# Patient Record
Sex: FEMALE | Race: BLACK OR AFRICAN AMERICAN | NOT HISPANIC OR LATINO | Employment: OTHER | URBAN - METROPOLITAN AREA
[De-identification: names, ages, dates, MRNs, and addresses within clinical notes are randomized per-mention and may not be internally consistent; named-entity substitution may affect disease eponyms.]

---

## 2020-10-10 ENCOUNTER — HOSPITAL ENCOUNTER (EMERGENCY)
Facility: OTHER | Age: 70
Discharge: HOME | End: 2020-10-10
Attending: EMERGENCY MEDICINE
Payer: MEDICARE

## 2020-10-10 VITALS
BODY MASS INDEX: 27.01 KG/M2 | OXYGEN SATURATION: 100 % | DIASTOLIC BLOOD PRESSURE: 80 MMHG | TEMPERATURE: 99 F | WEIGHT: 134 LBS | SYSTOLIC BLOOD PRESSURE: 159 MMHG | HEIGHT: 59 IN | HEART RATE: 95 BPM | RESPIRATION RATE: 19 BRPM

## 2020-10-10 DIAGNOSIS — S93.601A SPRAIN OF RIGHT FOOT, INITIAL ENCOUNTER: Primary | ICD-10-CM

## 2020-10-10 DIAGNOSIS — R52 PAIN: ICD-10-CM

## 2020-10-10 PROCEDURE — 99283 EMERGENCY DEPT VISIT LOW MDM: CPT | Mod: 25

## 2020-10-10 RX ORDER — AMLODIPINE BESYLATE 10 MG/1
10 TABLET ORAL DAILY
COMMUNITY

## 2020-10-10 RX ORDER — MULTIVITAMIN
1 TABLET ORAL DAILY
COMMUNITY

## 2020-10-10 RX ORDER — PRAVASTATIN SODIUM 20 MG/1
20 TABLET ORAL DAILY
COMMUNITY

## 2020-10-10 NOTE — ED TRIAGE NOTES
Pt AAOx4, presented to the ED with R foot pain. Pt stated she doesn't know what she did but it hurts. Pt stated she is a very active person. Pt denies taking any pain medication. Pain 5/10, feels like intermitted pinching. Pt denies SOB, chest pain, N/V/D and/or fever/chills.

## 2020-10-10 NOTE — ED PROVIDER NOTES
Encounter Date: 10/10/2020    SCRIBE #1 NOTE: I, Sharon Binu, am scribing for, and in the presence of, Dr. Soriano.       History     Chief Complaint   Patient presents with    Foot Pain     Pt reports right foot pain for the past three weeks. She is unsure of an injury but thinks she may have hit it on something     Time seen by provider: 2:08 PM    This is a 70 y.o. female who presents with complaint of right foot pain for the past 3 weeks. Pain is located on the lateral half of the dorsal aspect of her midfoot. She believes something may have fallen on her foot. She has not been taking anything for pain, though reports gradual improvement of pain since onset. Pain is worse with weight bearing and she is a frequent walker.  She denies any pain at rest.  Denies any redness.  She denies additional complaints at this time.    The history is provided by the patient.     Review of patient's allergies indicates:  No Known Allergies  Past Medical History:   Diagnosis Date    GERD (gastroesophageal reflux disease)     Hypertension      Past Surgical History:   Procedure Laterality Date    CHOLECYSTECTOMY      HYSTERECTOMY       History reviewed. No pertinent family history.  Social History     Tobacco Use    Smoking status: Never Smoker    Smokeless tobacco: Never Used   Substance Use Topics    Alcohol use: No    Drug use: No     Review of Systems   Constitutional: Negative for chills and fever.   Musculoskeletal: Positive for joint swelling. Negative for back pain and neck pain.        Positive for right foot pain.   Skin: Negative for color change and rash.   Neurological: Negative for weakness and numbness.       Physical Exam     Initial Vitals [10/10/20 1221]   BP Pulse Resp Temp SpO2   (!) 163/77 106 18 98.8 °F (37.1 °C) 99 %      MAP       --         Physical Exam    Nursing note and vitals reviewed.  Constitutional: She appears well-developed and well-nourished.   HENT:   Head: Normocephalic and atraumatic.    Eyes: Conjunctivae and EOM are normal.   Neck: Normal range of motion.   Cardiovascular:   2+ DP/PT pulses bilaterally.   Pulmonary/Chest: No respiratory distress.   Musculoskeletal: Normal range of motion.      Comments: RLE: Mild tenderness along lateral aspect of dorsal mid-foot with some associated swelling.  No overlying erythema.   Neurological: She is alert and oriented to person, place, and time.   Skin: Skin is warm and dry.   Psychiatric: Judgment and thought content normal.         ED Course   Procedures  Labs Reviewed - No data to display       Imaging Results          X-Ray Foot Complete Right (Final result)  Result time 10/10/20 12:49:45    Final result by Susan Lowe MD (10/10/20 12:49:45)                 Impression:      No etiology for the patient's pain identified on this exam.      Electronically signed by: Susan Lowe MD  Date:    10/10/2020  Time:    12:49             Narrative:    EXAMINATION:  XR FOOT COMPLETE 3 VIEW RIGHT    CLINICAL HISTORY:  . Pain, unspecified    TECHNIQUE:  AP, lateral, and oblique views of the right foot were performed.    COMPARISON:  None    FINDINGS:  There is calcified peripheral vascular atherosclerosis.  No fracture or dislocation.  The joint spaces are preserved.  In the area of interest, base of the 5th metatarsal, the findings are most compatible with a nutrient foramen rather than a fracture.  Specifically, no edema is identified in this region to support secondary signs for fracture.                              X-Rays:   Independently Interpreted Readings:   Other Readings:  X-Ray Foot Complete Right: No fracture or dislocation.    Medical Decision Making:   History:   Old Medical Records: I decided to obtain old medical records.  Old Records Summarized: other records.  Initial Assessment:   2:08PM:  Patient is a 70-year-old female who presents to the emergency department with right foot pain.  Patient appears well, nontoxic.  She does believe  that she did have some trauma to her foot.  She does have some tenderness and swelling to the lateral dorsal aspect of her midfoot.  Her x-ray from triage was negative for any acute findings.  Patient does likely have a sprain.  I do not appreciate any erythema, warmth, or any other evidence of infection.  Will plan for a postop shoe along with taking OTC analgesia as needed.  Do not feel that further workup in emergency room is indicated at this time.  I updated the patient regarding the x-ray results along with counseling the patient regarding supportive care measures.  I have discussed with the pt ED return warnings and need for close PCP f/u.  Pt agreeable to plan and all questions answered.  I feel that pt is stable for discharge and management as an outpatient and no further intervention is needed at this time.  Pt is comfortable returning to the ED if needed.  Will DC home in stable condition.      Independently Interpreted Test(s):   I have ordered and independently interpreted X-rays - see prior notes.  Clinical Tests:   Radiological Study: Ordered and Reviewed            Scribe Attestation:   Scribe #1: I performed the above scribed service and the documentation accurately describes the services I performed. I attest to the accuracy of the note.    Attending Attestation:           Physician Attestation for Scribe:  Physician Attestation Statement for Scribe #1: I, Dr. Soriano, reviewed documentation, as scribed by Sharon Schmid in my presence, and it is both accurate and complete.                           Clinical Impression:       ICD-10-CM ICD-9-CM   1. Sprain of right foot, initial encounter  S93.601A 845.10   2. Pain  R52 780.96                          ED Disposition Condition    Discharge Stable        ED Prescriptions     None        Follow-up Information     Follow up With Specialties Details Why Contact Info    [facility]   [address]  [id number]  [phone]      [facility]   [address] [id number] [phone]                                       Maria Elena Soriano MD  10/10/20 5141

## 2020-10-10 NOTE — DISCHARGE INSTRUCTIONS
Take tylenol as needed for pain.    Follow up with podiatry.    Please return to the ER if you have chest pain, difficulty breathing, fevers, altered mental status, dizziness, weakness, or any other concerns.

## 2020-10-20 ENCOUNTER — OFFICE VISIT (OUTPATIENT)
Dept: PODIATRY | Facility: CLINIC | Age: 70
End: 2020-10-20
Payer: MEDICARE

## 2020-10-20 VITALS
HEART RATE: 93 BPM | SYSTOLIC BLOOD PRESSURE: 150 MMHG | HEIGHT: 59 IN | BODY MASS INDEX: 26 KG/M2 | WEIGHT: 129 LBS | DIASTOLIC BLOOD PRESSURE: 70 MMHG

## 2020-10-20 DIAGNOSIS — M84.374A STRESS FRACTURE OF METATARSAL BONE OF RIGHT FOOT, INITIAL ENCOUNTER: ICD-10-CM

## 2020-10-20 DIAGNOSIS — M84.374A STRESS FRACTURE, RIGHT FOOT, INITIAL ENCOUNTER FOR FRACTURE: Primary | ICD-10-CM

## 2020-10-20 DIAGNOSIS — M79.671 RIGHT FOOT PAIN: ICD-10-CM

## 2020-10-20 PROCEDURE — 1159F PR MEDICATION LIST DOCUMENTED IN MEDICAL RECORD: ICD-10-PCS | Mod: S$GLB,,, | Performed by: PODIATRIST

## 2020-10-20 PROCEDURE — 1159F MED LIST DOCD IN RCRD: CPT | Mod: S$GLB,,, | Performed by: PODIATRIST

## 2020-10-20 PROCEDURE — 28470 CLTX METATARSAL FX WO MNP EA: CPT | Mod: RT,S$GLB,, | Performed by: PODIATRIST

## 2020-10-20 PROCEDURE — 99203 PR OFFICE/OUTPT VISIT, NEW, LEVL III, 30-44 MIN: ICD-10-PCS | Mod: 57,S$GLB,, | Performed by: PODIATRIST

## 2020-10-20 PROCEDURE — 3078F DIAST BP <80 MM HG: CPT | Mod: CPTII,S$GLB,, | Performed by: PODIATRIST

## 2020-10-20 PROCEDURE — 3008F PR BODY MASS INDEX (BMI) DOCUMENTED: ICD-10-PCS | Mod: CPTII,S$GLB,, | Performed by: PODIATRIST

## 2020-10-20 PROCEDURE — 3008F BODY MASS INDEX DOCD: CPT | Mod: CPTII,S$GLB,, | Performed by: PODIATRIST

## 2020-10-20 PROCEDURE — 99999 PR PBB SHADOW E&M-EST. PATIENT-LVL IV: ICD-10-PCS | Mod: PBBFAC,,, | Performed by: PODIATRIST

## 2020-10-20 PROCEDURE — 99203 OFFICE O/P NEW LOW 30 MIN: CPT | Mod: 57,S$GLB,, | Performed by: PODIATRIST

## 2020-10-20 PROCEDURE — 1125F AMNT PAIN NOTED PAIN PRSNT: CPT | Mod: S$GLB,,, | Performed by: PODIATRIST

## 2020-10-20 PROCEDURE — 3077F PR MOST RECENT SYSTOLIC BLOOD PRESSURE >= 140 MM HG: ICD-10-PCS | Mod: CPTII,S$GLB,, | Performed by: PODIATRIST

## 2020-10-20 PROCEDURE — 1101F PT FALLS ASSESS-DOCD LE1/YR: CPT | Mod: CPTII,S$GLB,, | Performed by: PODIATRIST

## 2020-10-20 PROCEDURE — 99999 PR PBB SHADOW E&M-EST. PATIENT-LVL IV: CPT | Mod: PBBFAC,,, | Performed by: PODIATRIST

## 2020-10-20 PROCEDURE — 3078F PR MOST RECENT DIASTOLIC BLOOD PRESSURE < 80 MM HG: ICD-10-PCS | Mod: CPTII,S$GLB,, | Performed by: PODIATRIST

## 2020-10-20 PROCEDURE — 28470 PR CLOSED RX METATARSAL FX: ICD-10-PCS | Mod: RT,S$GLB,, | Performed by: PODIATRIST

## 2020-10-20 PROCEDURE — 1125F PR PAIN SEVERITY QUANTIFIED, PAIN PRESENT: ICD-10-PCS | Mod: S$GLB,,, | Performed by: PODIATRIST

## 2020-10-20 PROCEDURE — 3077F SYST BP >= 140 MM HG: CPT | Mod: CPTII,S$GLB,, | Performed by: PODIATRIST

## 2020-10-20 PROCEDURE — 1101F PR PT FALLS ASSESS DOC 0-1 FALLS W/OUT INJ PAST YR: ICD-10-PCS | Mod: CPTII,S$GLB,, | Performed by: PODIATRIST

## 2020-10-20 RX ORDER — ASPIRIN 81 MG/1
81 TABLET ORAL
COMMUNITY
Start: 2020-04-27

## 2020-10-20 RX ORDER — HYDROCHLOROTHIAZIDE 25 MG/1
25 TABLET ORAL
COMMUNITY
Start: 2020-04-27

## 2020-10-20 RX ORDER — ALENDRONATE SODIUM 70 MG/1
TABLET ORAL
COMMUNITY
Start: 2020-07-25

## 2020-10-20 RX ORDER — PRAVASTATIN SODIUM 20 MG/1
20 TABLET ORAL
COMMUNITY
Start: 2020-05-13

## 2020-10-20 RX ORDER — AMLODIPINE BESYLATE 10 MG/1
10 TABLET ORAL
COMMUNITY
Start: 2020-01-16

## 2020-10-20 NOTE — PROGRESS NOTES
Subjective:      Patient ID: Almedia J Phoenix is a 70 y.o. female.    Chief Complaint: Foot Pain (Right Foot)    Patient presents to clinic with chief complaint of pain in the right foot, she points to area at the right 5th metatarsal base/shaft as source of pain. Symptoms started 1.5 months ago. She denies any trauma or foot surgery.  Pain is described as deep, sharp, throbbing pain aggravated with weightbearing, pressure, certain shoegear. She presented to the ED 2 weeks ago for right foot pain and xrays were obtained and she was told she did not have a fracture and was sent home with a post op shoe. She has been icing, elevating, weightbearing in post op shoe and taking OTC tylenol with no relief. Rates pain as 6/10.      Review of Systems   Constitution: Negative for chills, diaphoresis, fever and malaise/fatigue.   HENT: Negative for hearing loss.    Cardiovascular: Negative for claudication, leg swelling and syncope.   Respiratory: Negative for cough, shortness of breath, sputum production and wheezing.    Skin: Positive for dry skin. Negative for color change, flushing, nail changes, poor wound healing, rash, suspicious lesions and unusual hair distribution.   Musculoskeletal: Positive for myalgias. Negative for falls, gout, joint pain and muscle weakness.        R foot pain   Gastrointestinal: Negative for abdominal pain, nausea and vomiting.   Neurological: Negative for focal weakness, loss of balance, numbness, paresthesias, sensory change and weakness.   Psychiatric/Behavioral: Negative for altered mental status.           Objective:      Physical Exam  Constitutional:       General: She is not in acute distress.     Appearance: Normal appearance.   Cardiovascular:      Pulses:           Dorsalis pedis pulses are 2+ on the right side and 2+ on the left side.        Posterior tibial pulses are 2+ on the right side and 2+ on the left side.      Comments: 3 sec capillary refill time x 10 digits. Toes and  feet are warm to touch b/l. There is normal hair distribution on the feet and toes b/l. There is no edema b/l. No spider veins or varicosities present b/l.     Musculoskeletal:         General: Tenderness present. No swelling, deformity or signs of injury.      Comments: Mild edema and tenderness to palpation to dorsal and lateral aspect of right 5th metatarsal shaft and base.    Mild pain with plantarflexion, eversion, right lower extremity. Muscle strength is 5/5 in all groups bilaterally.  MTP, subtalar, and ankle joints range of motion are within normal limits without pain or crepitus bilaterally.    No deformity with rectus foot and ankle alignment bilat.    Ankle dorsiflexion decreased at <10 degrees bilateral with moderate increase with knee flexion bilateral.    Normal angle, base, station of gait. All ten toes without clubbing, cyanosis, or signs of ischemia.     Range of motion, stability, muscle strength, and muscle tone normal bilateral feet and legs.       Lymphadenopathy:      Comments: Negative lymphadenopathy bilateral popliteal fossa and tarsal tunnel.    Negative lymphangitic streaking bilateral feet/ankles/legs.      Skin:     General: Skin is warm and dry.      Capillary Refill: Capillary refill takes 2 to 3 seconds.      Findings: No bruising, erythema, lesion or rash.      Comments: Skin is normal age and health appropriate color, turgor, texture, and temperature bilateral lower extremities without ulceration, hyperpigmentation, discoloration, masses nodules or cords palpated.  No ecchymosis, erythema, or cardinal signs of infection bilateral lower extremities.     Neurological:      Mental Status: She is alert and oriented to person, place, and time.      Sensory: No sensory deficit.      Motor: No weakness.      Comments: Negative tinel sign to percussion sural, superficial peroneal, deep peroneal, saphenous, and posterior tibial nerves right and left ankles and feet.      Psychiatric:          Mood and Affect: Mood normal.         Thought Content: Thought content normal.                   Assessment:       Encounter Diagnoses   Name Primary?    Right foot pain     Stress fracture, right foot, initial encounter for fracture Yes         Plan:       Sheldon was seen today for foot pain.    Diagnoses and all orders for this visit:    Stress fracture, right foot, initial encounter for fracture    Right foot pain  -     X-Ray Foot Complete Right; Future      I counseled the patient on her conditions, their implications and medical management.    Xray of right foot obtained 10/10/20 was personally reviewed in office with patient. Right foot 5th metatarsal base with transverse, extraarticular area of lucency concerning for nondisplaced 5th metatarsal stress fracture.     Dispense fx boot right - ambulate to tolerance weaning to shoes when symptoms allow.       RICE right ankle in neutral position      The patient was advised that NSAID-type medications have two very important potential side effects: gastrointestinal irritation including hemorrhage and renal injuries. He was asked to take the medication with food and to stop if he experiences any GI upset. I asked him to call for vomiting, abdominal pain or black/bloody stools. The patient expresses understanding of these issues and questions were answered.    Rx lidocaine jelly topical to be applied daily to affected foot/ankle for symptom relief.     Discussed conservative treatment with shoes of adequate dimensions, material, and style to alleviate symptoms and delay or prevent surgical intervention.     Patient will stretch the tendo achilles complex three times daily as demonstrated in the office.  Literature was dispensed illustrating proper stretching technique.     I applied a plantar rest strapping to the patient's right foot to offload symptomatic area, support the arch, and relieve pain.     Ordered right foot xray to be obtained prior to next clinic  visit    Return to clinic in 1 month (around 11/20/2020) for follow up.     Patient seen and assisted by resident Suze Hook PGY2 under the direct supervision of attending Dr. Levy

## 2020-10-23 ENCOUNTER — TELEPHONE (OUTPATIENT)
Dept: PODIATRY | Facility: CLINIC | Age: 70
End: 2020-10-23

## 2020-10-23 RX ORDER — LIDOCAINE HYDROCHLORIDE 20 MG/ML
JELLY TOPICAL
Qty: 30 ML | Refills: 2 | Status: SHIPPED | OUTPATIENT
Start: 2020-10-23 | End: 2022-01-20

## 2020-10-23 NOTE — TELEPHONE ENCOUNTER
Spoke with patient Rx has been sent.                ----- Message from Kenny Levy DPM sent at 10/23/2020  1:35 PM CDT -----  Regarding: RE: medication  Entered.  Please let her know it has been sent.  Thank you.  ----- Message -----  From: July Palm MA  Sent: 10/23/2020  11:42 AM CDT  To: Kenny Levy DPM  Subject: FW: medication                                     ----- Message -----  From: Rebekah Meade  Sent: 10/23/2020   9:21 AM CDT  To: Cam Staton  Subject: medication                                       Name of Who is Calling: PHOENIX, ALMEDIA J [[id number]] What is the request in detail: Patient requesting a call back out the  lidocaine jelly topical that wasn't called in to the pharmacy to [facility] [address] [place] [address] Can the clinic reply by [facility]: No What Number to Call Back if not in [facility]: [phone]

## 2020-10-23 NOTE — TELEPHONE ENCOUNTER
Spoke with patient Dr Levy will send Rx to pharmacy            ----- Message from Rebekah Meade sent at 10/23/2020  9:21 AM CDT -----  Regarding: medication  Name of Who is Calling: PHOENIX, ALMEDIA J [[id number]] What is the request in detail: Patient requesting a call back out the  lidocaine jelly topical that wasn't called in to the pharmacy to [address] Can the clinic reply by [facility]: No What Number to Call Back if not in [facility]: [phone]

## 2020-11-24 ENCOUNTER — APPOINTMENT (OUTPATIENT)
Dept: RADIOLOGY | Facility: OTHER | Age: 70
End: 2020-11-24
Attending: STUDENT IN AN ORGANIZED HEALTH CARE EDUCATION/TRAINING PROGRAM
Payer: MEDICARE

## 2020-11-24 ENCOUNTER — OFFICE VISIT (OUTPATIENT)
Dept: PODIATRY | Facility: CLINIC | Age: 70
End: 2020-11-24
Payer: MEDICARE

## 2020-11-24 VITALS
HEIGHT: 59 IN | SYSTOLIC BLOOD PRESSURE: 144 MMHG | DIASTOLIC BLOOD PRESSURE: 68 MMHG | WEIGHT: 129 LBS | BODY MASS INDEX: 26 KG/M2 | HEART RATE: 92 BPM

## 2020-11-24 DIAGNOSIS — M79.671 RIGHT FOOT PAIN: ICD-10-CM

## 2020-11-24 DIAGNOSIS — M84.374D STRESS FRACTURE, RIGHT FOOT, SUBSEQUENT ENCOUNTER FOR FRACTURE WITH ROUTINE HEALING: ICD-10-CM

## 2020-11-24 DIAGNOSIS — M79.671 RIGHT FOOT PAIN: Primary | ICD-10-CM

## 2020-11-24 PROCEDURE — 73630 X-RAY EXAM OF FOOT: CPT | Mod: 26,RT,, | Performed by: RADIOLOGY

## 2020-11-24 PROCEDURE — 73630 XR FOOT COMPLETE 3 VIEW RIGHT: ICD-10-PCS | Mod: 26,RT,, | Performed by: RADIOLOGY

## 2020-11-24 PROCEDURE — 3288F PR FALLS RISK ASSESSMENT DOCUMENTED: ICD-10-PCS | Mod: CPTII,S$GLB,, | Performed by: PODIATRIST

## 2020-11-24 PROCEDURE — 1101F PT FALLS ASSESS-DOCD LE1/YR: CPT | Mod: CPTII,S$GLB,, | Performed by: PODIATRIST

## 2020-11-24 PROCEDURE — 3288F FALL RISK ASSESSMENT DOCD: CPT | Mod: CPTII,S$GLB,, | Performed by: PODIATRIST

## 2020-11-24 PROCEDURE — 3008F BODY MASS INDEX DOCD: CPT | Mod: CPTII,S$GLB,, | Performed by: PODIATRIST

## 2020-11-24 PROCEDURE — 99999 PR PBB SHADOW E&M-EST. PATIENT-LVL IV: ICD-10-PCS | Mod: PBBFAC,,, | Performed by: PODIATRIST

## 2020-11-24 PROCEDURE — 1125F AMNT PAIN NOTED PAIN PRSNT: CPT | Mod: S$GLB,,, | Performed by: PODIATRIST

## 2020-11-24 PROCEDURE — 1101F PR PT FALLS ASSESS DOC 0-1 FALLS W/OUT INJ PAST YR: ICD-10-PCS | Mod: CPTII,S$GLB,, | Performed by: PODIATRIST

## 2020-11-24 PROCEDURE — 99024 POSTOP FOLLOW-UP VISIT: CPT | Mod: S$GLB,,, | Performed by: PODIATRIST

## 2020-11-24 PROCEDURE — 73630 X-RAY EXAM OF FOOT: CPT | Mod: TC,RT

## 2020-11-24 PROCEDURE — 1125F PR PAIN SEVERITY QUANTIFIED, PAIN PRESENT: ICD-10-PCS | Mod: S$GLB,,, | Performed by: PODIATRIST

## 2020-11-24 PROCEDURE — 3008F PR BODY MASS INDEX (BMI) DOCUMENTED: ICD-10-PCS | Mod: CPTII,S$GLB,, | Performed by: PODIATRIST

## 2020-11-24 PROCEDURE — 99999 PR PBB SHADOW E&M-EST. PATIENT-LVL IV: CPT | Mod: PBBFAC,,, | Performed by: PODIATRIST

## 2020-11-24 PROCEDURE — 99024 PR POST-OP FOLLOW-UP VISIT: ICD-10-PCS | Mod: S$GLB,,, | Performed by: PODIATRIST

## 2020-11-24 NOTE — PROGRESS NOTES
Subjective:      Patient ID: Almedia J Phoenix is a 70 y.o. female.    Chief Complaint: Follow-up (Stress Fx)    Patient presents to clinic with chief complaint of pain in the right foot, she points to area at the right 5th metatarsal base/shaft as source of pain. Also reports to localized swelling in the area.  Symptoms started 2.5 months ago. She denies any trauma or foot surgery.  Pain is described as deep, sharp, throbbing pain aggravated with weightbearing, pressure, certain shoegear. Pain has improved significantly since last clinic visit, but still has residual pain with weightbearing and rates pain as 2-3/10. She has been weightbearing as tolerated in short CAM boot with some relief.     Review of Systems   Constitution: Negative for chills, diaphoresis, fever and malaise/fatigue.   HENT: Negative for hearing loss.    Cardiovascular: Negative for claudication, leg swelling and syncope.   Respiratory: Negative for cough, shortness of breath, sputum production and wheezing.    Skin: Positive for dry skin. Negative for color change, flushing, nail changes, poor wound healing, rash, suspicious lesions and unusual hair distribution.   Musculoskeletal: Positive for myalgias. Negative for falls, gout, joint pain and muscle weakness.        R foot pain   Gastrointestinal: Negative for abdominal pain, nausea and vomiting.   Neurological: Negative for focal weakness, loss of balance, numbness, paresthesias, sensory change and weakness.   Psychiatric/Behavioral: Negative for altered mental status.           Objective:      Physical Exam  Constitutional:       General: She is not in acute distress.     Appearance: Normal appearance.   Cardiovascular:      Pulses:           Dorsalis pedis pulses are 2+ on the right side and 2+ on the left side.        Posterior tibial pulses are 2+ on the right side and 2+ on the left side.      Comments: 3 sec capillary refill time x 10 digits. Toes and feet are warm to touch b/l. There  is normal hair distribution on the feet and toes b/l. There is no edema b/l. No spider veins or varicosities present b/l.     Musculoskeletal:         General: Tenderness present. No swelling, deformity or signs of injury.      Comments: Mild edema and tenderness to palpation to dorsal and lateral aspect of right 5th metatarsal shaft and base.    Mild pain with plantarflexion, eversion, right lower extremity. Muscle strength is 5/5 in all groups bilaterally.  MTP, subtalar, and ankle joints range of motion are within normal limits without pain or crepitus bilaterally.    No deformity with rectus foot and ankle alignment bilat.    Ankle dorsiflexion decreased at <10 degrees bilateral with moderate increase with knee flexion bilateral.    Normal angle, base, station of gait. All ten toes without clubbing, cyanosis, or signs of ischemia.     Range of motion, stability, muscle strength, and muscle tone normal bilateral feet and legs.       Lymphadenopathy:      Comments: Negative lymphadenopathy bilateral popliteal fossa and tarsal tunnel.    Negative lymphangitic streaking bilateral feet/ankles/legs.      Skin:     General: Skin is warm and dry.      Capillary Refill: Capillary refill takes 2 to 3 seconds.      Findings: No bruising, erythema, lesion or rash.      Comments: Skin is normal age and health appropriate color, turgor, texture, and temperature bilateral lower extremities without ulceration, hyperpigmentation, discoloration, masses nodules or cords palpated.  No ecchymosis, erythema, or cardinal signs of infection bilateral lower extremities.     Neurological:      Mental Status: She is alert and oriented to person, place, and time.      Sensory: No sensory deficit.      Motor: No weakness.      Comments: Negative tinel sign to percussion sural, superficial peroneal, deep peroneal, saphenous, and posterior tibial nerves right and left ankles and feet.      Psychiatric:         Mood and Affect: Mood normal.          Thought Content: Thought content normal.                   Assessment:       Encounter Diagnoses   Name Primary?    Right foot pain Yes    Stress fracture, right foot, subsequent encounter for fracture with routine healing          Plan:       Sheldon was seen today for follow-up.    Diagnoses and all orders for this visit:    Right foot pain    Stress fracture, right foot, subsequent encounter for fracture with routine healing      I counseled the patient on her conditions, their implications and medical management.    Xray of right foot obtained 10/10/20 was personally reviewed in office with patient. Right foot 5th metatarsal base with transverse, extraarticular area of lucency concerning for nondisplaced 5th metatarsal stress fracture.   Repeat xray obtained 11/24/2020 with good interval healing at the right 5th metatarsal base.     Continue fx boot right - ambulate to tolerance weaning to shoes when symptoms allow. Continue walker all times for stability.      RICE right ankle in neutral position      The patient was advised that NSAID-type medications have two very important potential side effects: gastrointestinal irritation including hemorrhage and renal injuries. He was asked to take the medication with food and to stop if he experiences any GI upset. I asked him to call for vomiting, abdominal pain or black/bloody stools. The patient expresses understanding of these issues and questions were answered.    Continue lidocaine jelly topical to be applied daily to affected foot/ankle for symptom relief.     Discussed conservative treatment with shoes of adequate dimensions, material, and style to alleviate symptoms and delay or prevent surgical intervention.    Repeat xray right foot in 1 month prior to next clinic visit    Return to clinic in 1 month (around 12/20/2020) for follow up.     Patient seen and assisted by resident Suze POOLE under the direct supervision of attending Dr. Levy

## 2020-12-24 ENCOUNTER — APPOINTMENT (OUTPATIENT)
Dept: RADIOLOGY | Facility: OTHER | Age: 70
End: 2020-12-24
Attending: PODIATRIST
Payer: MEDICARE

## 2020-12-24 DIAGNOSIS — M84.374D STRESS FRACTURE, RIGHT FOOT, SUBSEQUENT ENCOUNTER FOR FRACTURE WITH ROUTINE HEALING: ICD-10-CM

## 2020-12-24 DIAGNOSIS — M79.671 RIGHT FOOT PAIN: ICD-10-CM

## 2020-12-24 PROCEDURE — 73630 X-RAY EXAM OF FOOT: CPT | Mod: TC,RT

## 2020-12-24 PROCEDURE — 73630 X-RAY EXAM OF FOOT: CPT | Mod: 26,RT,, | Performed by: RADIOLOGY

## 2020-12-24 PROCEDURE — 73630 XR FOOT COMPLETE 3 VIEW RIGHT: ICD-10-PCS | Mod: 26,RT,, | Performed by: RADIOLOGY

## 2020-12-29 ENCOUNTER — OFFICE VISIT (OUTPATIENT)
Dept: PODIATRY | Facility: CLINIC | Age: 70
End: 2020-12-29
Payer: MEDICARE

## 2020-12-29 VITALS
WEIGHT: 129 LBS | BODY MASS INDEX: 26 KG/M2 | HEART RATE: 103 BPM | HEIGHT: 59 IN | DIASTOLIC BLOOD PRESSURE: 69 MMHG | SYSTOLIC BLOOD PRESSURE: 148 MMHG

## 2020-12-29 DIAGNOSIS — M84.374D STRESS FRACTURE, RIGHT FOOT, SUBSEQUENT ENCOUNTER FOR FRACTURE WITH ROUTINE HEALING: Primary | ICD-10-CM

## 2020-12-29 PROCEDURE — 1101F PR PT FALLS ASSESS DOC 0-1 FALLS W/OUT INJ PAST YR: ICD-10-PCS | Mod: CPTII,S$GLB,, | Performed by: PODIATRIST

## 2020-12-29 PROCEDURE — 1126F PR PAIN SEVERITY QUANTIFIED, NO PAIN PRESENT: ICD-10-PCS | Mod: S$GLB,,, | Performed by: PODIATRIST

## 2020-12-29 PROCEDURE — 3008F PR BODY MASS INDEX (BMI) DOCUMENTED: ICD-10-PCS | Mod: CPTII,S$GLB,, | Performed by: PODIATRIST

## 2020-12-29 PROCEDURE — 3008F BODY MASS INDEX DOCD: CPT | Mod: CPTII,S$GLB,, | Performed by: PODIATRIST

## 2020-12-29 PROCEDURE — 99999 PR PBB SHADOW E&M-EST. PATIENT-LVL IV: CPT | Mod: PBBFAC,,, | Performed by: PODIATRIST

## 2020-12-29 PROCEDURE — 99024 POSTOP FOLLOW-UP VISIT: CPT | Mod: S$GLB,,, | Performed by: PODIATRIST

## 2020-12-29 PROCEDURE — 1101F PT FALLS ASSESS-DOCD LE1/YR: CPT | Mod: CPTII,S$GLB,, | Performed by: PODIATRIST

## 2020-12-29 PROCEDURE — 3288F PR FALLS RISK ASSESSMENT DOCUMENTED: ICD-10-PCS | Mod: CPTII,S$GLB,, | Performed by: PODIATRIST

## 2020-12-29 PROCEDURE — 3288F FALL RISK ASSESSMENT DOCD: CPT | Mod: CPTII,S$GLB,, | Performed by: PODIATRIST

## 2020-12-29 PROCEDURE — 1126F AMNT PAIN NOTED NONE PRSNT: CPT | Mod: S$GLB,,, | Performed by: PODIATRIST

## 2020-12-29 PROCEDURE — 99999 PR PBB SHADOW E&M-EST. PATIENT-LVL IV: ICD-10-PCS | Mod: PBBFAC,,, | Performed by: PODIATRIST

## 2020-12-29 PROCEDURE — 99024 PR POST-OP FOLLOW-UP VISIT: ICD-10-PCS | Mod: S$GLB,,, | Performed by: PODIATRIST

## 2020-12-29 NOTE — PROGRESS NOTES
Subjective:      Patient ID: Almedia J Phoenix is a 70 y.o. female.    Chief Complaint: Foot Injury    Follow-up right 5th metatarsal stress fracture.  Patient ambulating to tolerance in fracture boot right.  Minimal symptoms with mild intermittent pain just enough to notice resolving immediately.  Generally she says is trending for the better significantly.  Does not bother her on a daily basis.    X-rays today show good healing bony bridging of the stress fracture proximal 5th metatarsal right in good position.    Review of Systems   Constitution: Negative for chills, diaphoresis, fever and malaise/fatigue.   HENT: Negative for hearing loss.    Cardiovascular: Negative for claudication, leg swelling and syncope.   Respiratory: Negative for cough, shortness of breath, sputum production and wheezing.    Skin: Positive for dry skin. Negative for color change, flushing, nail changes, poor wound healing, rash, suspicious lesions and unusual hair distribution.   Musculoskeletal: Positive for myalgias. Negative for falls, gout, joint pain and muscle weakness.        R foot pain   Gastrointestinal: Negative for abdominal pain, nausea and vomiting.   Neurological: Negative for focal weakness, loss of balance, numbness, paresthesias, sensory change and weakness.   Psychiatric/Behavioral: Negative for altered mental status.           Objective:      Physical Exam  Constitutional:       General: She is not in acute distress.     Appearance: Normal appearance.   Cardiovascular:      Pulses:           Dorsalis pedis pulses are 2+ on the right side and 2+ on the left side.        Posterior tibial pulses are 2+ on the right side and 2+ on the left side.      Comments: 3 sec capillary refill time x 10 digits. Toes and feet are warm to touch b/l. There is normal hair distribution on the feet and toes b/l. There is no edema b/l. No spider veins or varicosities present b/l.     Musculoskeletal:         General: Tenderness present. No  swelling, deformity or signs of injury.      Comments: Mild fullness without tenderness to palpation to dorsal and lateral aspect of right 5th metatarsal shaft and base.    No current pain with plantarflexion, eversion, right lower extremity. Muscle strength is 5/5 in all groups bilaterally.  MTP, subtalar, and ankle joints range of motion are within normal limits without pain or crepitus bilaterally.    No deformity with rectus foot and ankle alignment bilat.    Ankle dorsiflexion decreased at <10 degrees bilateral with moderate increase with knee flexion bilateral.        Range of motion, stability, muscle strength, and muscle tone normal bilateral feet and legs.       Lymphadenopathy:      Comments: Negative lymphadenopathy bilateral popliteal fossa and tarsal tunnel.    Negative lymphangitic streaking bilateral feet/ankles/legs.      Skin:     General: Skin is warm and dry.      Capillary Refill: Capillary refill takes 2 to 3 seconds.      Findings: No bruising, erythema, lesion or rash.      Comments: Skin is normal age and health appropriate color, turgor, texture, and temperature bilateral lower extremities without ulceration, hyperpigmentation, discoloration, masses nodules or cords palpated.  No ecchymosis, erythema, or cardinal signs of infection bilateral lower extremities.     Neurological:      Mental Status: She is alert and oriented to person, place, and time.      Sensory: No sensory deficit.      Motor: No weakness.      Comments: Negative tinel sign to percussion sural, superficial peroneal, deep peroneal, saphenous, and posterior tibial nerves right and left ankles and feet.      Psychiatric:         Mood and Affect: Mood normal.         Thought Content: Thought content normal.                   Assessment:       No diagnosis found.      Plan:       There are no diagnoses linked to this encounter.  I counseled the patient on her conditions, their implications and medical management.    Patient can  now wean from the fracture boot right hour at a time until she resumes full-time ambulation in casual shoe gear.    She will continue to use the cane for stability at all times when ambulating.    Follow up 1 month for re-evaluation x-rays prior sooner p.r.n..    If she experiences pain or swelling in the right foot she will revert to the fracture boot and present here immediately for evaluation.

## 2021-02-02 ENCOUNTER — APPOINTMENT (OUTPATIENT)
Dept: RADIOLOGY | Facility: OTHER | Age: 71
End: 2021-02-02
Attending: PODIATRIST
Payer: MEDICARE

## 2021-02-02 ENCOUNTER — OFFICE VISIT (OUTPATIENT)
Dept: PODIATRY | Facility: CLINIC | Age: 71
End: 2021-02-02
Payer: MEDICARE

## 2021-02-02 VITALS
DIASTOLIC BLOOD PRESSURE: 64 MMHG | WEIGHT: 128 LBS | BODY MASS INDEX: 25.8 KG/M2 | SYSTOLIC BLOOD PRESSURE: 142 MMHG | HEART RATE: 93 BPM | HEIGHT: 59 IN

## 2021-02-02 DIAGNOSIS — M84.374D STRESS FRACTURE, RIGHT FOOT, SUBSEQUENT ENCOUNTER FOR FRACTURE WITH ROUTINE HEALING: ICD-10-CM

## 2021-02-02 DIAGNOSIS — M84.374D STRESS FRACTURE, RIGHT FOOT, SUBSEQUENT ENCOUNTER FOR FRACTURE WITH ROUTINE HEALING: Primary | ICD-10-CM

## 2021-02-02 PROCEDURE — 1101F PT FALLS ASSESS-DOCD LE1/YR: CPT | Mod: CPTII,S$GLB,, | Performed by: PODIATRIST

## 2021-02-02 PROCEDURE — 99999 PR PBB SHADOW E&M-EST. PATIENT-LVL IV: CPT | Mod: PBBFAC,,, | Performed by: PODIATRIST

## 2021-02-02 PROCEDURE — 99024 POSTOP FOLLOW-UP VISIT: CPT | Mod: S$GLB,,, | Performed by: PODIATRIST

## 2021-02-02 PROCEDURE — 1126F PR PAIN SEVERITY QUANTIFIED, NO PAIN PRESENT: ICD-10-PCS | Mod: S$GLB,,, | Performed by: PODIATRIST

## 2021-02-02 PROCEDURE — 1101F PR PT FALLS ASSESS DOC 0-1 FALLS W/OUT INJ PAST YR: ICD-10-PCS | Mod: CPTII,S$GLB,, | Performed by: PODIATRIST

## 2021-02-02 PROCEDURE — 3288F FALL RISK ASSESSMENT DOCD: CPT | Mod: CPTII,S$GLB,, | Performed by: PODIATRIST

## 2021-02-02 PROCEDURE — 73630 XR FOOT COMPLETE 3 VIEW RIGHT: ICD-10-PCS | Mod: 26,RT,, | Performed by: RADIOLOGY

## 2021-02-02 PROCEDURE — 3288F PR FALLS RISK ASSESSMENT DOCUMENTED: ICD-10-PCS | Mod: CPTII,S$GLB,, | Performed by: PODIATRIST

## 2021-02-02 PROCEDURE — 3008F PR BODY MASS INDEX (BMI) DOCUMENTED: ICD-10-PCS | Mod: CPTII,S$GLB,, | Performed by: PODIATRIST

## 2021-02-02 PROCEDURE — 99024 PR POST-OP FOLLOW-UP VISIT: ICD-10-PCS | Mod: S$GLB,,, | Performed by: PODIATRIST

## 2021-02-02 PROCEDURE — 73630 X-RAY EXAM OF FOOT: CPT | Mod: 26,RT,, | Performed by: RADIOLOGY

## 2021-02-02 PROCEDURE — 99999 PR PBB SHADOW E&M-EST. PATIENT-LVL IV: ICD-10-PCS | Mod: PBBFAC,,, | Performed by: PODIATRIST

## 2021-02-02 PROCEDURE — 73630 X-RAY EXAM OF FOOT: CPT | Mod: TC,RT

## 2021-02-02 PROCEDURE — 1126F AMNT PAIN NOTED NONE PRSNT: CPT | Mod: S$GLB,,, | Performed by: PODIATRIST

## 2021-02-02 PROCEDURE — 3008F BODY MASS INDEX DOCD: CPT | Mod: CPTII,S$GLB,, | Performed by: PODIATRIST

## 2021-06-29 ENCOUNTER — HOSPITAL ENCOUNTER (EMERGENCY)
Facility: OTHER | Age: 71
Discharge: HOME | End: 2021-06-29
Attending: EMERGENCY MEDICINE
Payer: MEDICARE

## 2021-06-29 VITALS
BODY MASS INDEX: 25.2 KG/M2 | HEIGHT: 59 IN | SYSTOLIC BLOOD PRESSURE: 146 MMHG | RESPIRATION RATE: 18 BRPM | WEIGHT: 125 LBS | OXYGEN SATURATION: 98 % | DIASTOLIC BLOOD PRESSURE: 76 MMHG | HEART RATE: 100 BPM | TEMPERATURE: 98 F

## 2021-06-29 DIAGNOSIS — M25.569 KNEE PAIN: ICD-10-CM

## 2021-06-29 DIAGNOSIS — M25.561 ACUTE PAIN OF RIGHT KNEE: Primary | ICD-10-CM

## 2021-06-29 PROCEDURE — 25000003 PHARM REV CODE 250: Performed by: NURSE PRACTITIONER

## 2021-06-29 PROCEDURE — 99284 EMERGENCY DEPT VISIT MOD MDM: CPT | Mod: 25

## 2021-06-29 RX ORDER — IBUPROFEN 600 MG/1
600 TABLET ORAL EVERY 6 HOURS PRN
Qty: 20 TABLET | Refills: 0 | Status: SHIPPED | OUTPATIENT
Start: 2021-06-29

## 2021-06-29 RX ORDER — DICLOFENAC SODIUM 10 MG/G
2 GEL TOPICAL DAILY
Qty: 100 G | Refills: 0 | OUTPATIENT
Start: 2021-06-29 | End: 2022-07-04

## 2021-06-29 RX ORDER — IBUPROFEN 600 MG/1
600 TABLET ORAL
Status: COMPLETED | OUTPATIENT
Start: 2021-06-29 | End: 2021-06-29

## 2021-06-29 RX ADMIN — IBUPROFEN 600 MG: 600 TABLET, FILM COATED ORAL at 11:06

## 2022-01-20 ENCOUNTER — OFFICE VISIT (OUTPATIENT)
Dept: PODIATRY | Facility: CLINIC | Age: 72
End: 2022-01-20
Payer: MEDICARE

## 2022-01-20 ENCOUNTER — APPOINTMENT (OUTPATIENT)
Dept: RADIOLOGY | Facility: OTHER | Age: 72
End: 2022-01-20
Attending: PODIATRIST
Payer: MEDICARE

## 2022-01-20 VITALS
BODY MASS INDEX: 25.2 KG/M2 | HEART RATE: 106 BPM | SYSTOLIC BLOOD PRESSURE: 150 MMHG | HEIGHT: 59 IN | WEIGHT: 125 LBS | DIASTOLIC BLOOD PRESSURE: 71 MMHG

## 2022-01-20 DIAGNOSIS — M79.671 FOOT PAIN, RIGHT: ICD-10-CM

## 2022-01-20 DIAGNOSIS — S96.911A STRAIN OF FOOT, RIGHT, INITIAL ENCOUNTER: ICD-10-CM

## 2022-01-20 DIAGNOSIS — S90.31XA CONTUSION OF RIGHT FOOT, INITIAL ENCOUNTER: Primary | ICD-10-CM

## 2022-01-20 DIAGNOSIS — S90.31XA CONTUSION OF RIGHT FOOT, INITIAL ENCOUNTER: ICD-10-CM

## 2022-01-20 PROCEDURE — 3008F BODY MASS INDEX DOCD: CPT | Mod: CPTII,S$GLB,, | Performed by: PODIATRIST

## 2022-01-20 PROCEDURE — 73630 XR FOOT COMPLETE 3 VIEW RIGHT: ICD-10-PCS | Mod: 26,RT,, | Performed by: RADIOLOGY

## 2022-01-20 PROCEDURE — 99214 PR OFFICE/OUTPT VISIT, EST, LEVL IV, 30-39 MIN: ICD-10-PCS | Mod: 25,S$GLB,, | Performed by: PODIATRIST

## 2022-01-20 PROCEDURE — 1160F PR REVIEW ALL MEDS BY PRESCRIBER/CLIN PHARMACIST DOCUMENTED: ICD-10-PCS | Mod: CPTII,S$GLB,, | Performed by: PODIATRIST

## 2022-01-20 PROCEDURE — 1160F RVW MEDS BY RX/DR IN RCRD: CPT | Mod: CPTII,S$GLB,, | Performed by: PODIATRIST

## 2022-01-20 PROCEDURE — 3077F SYST BP >= 140 MM HG: CPT | Mod: CPTII,S$GLB,, | Performed by: PODIATRIST

## 2022-01-20 PROCEDURE — 3288F PR FALLS RISK ASSESSMENT DOCUMENTED: ICD-10-PCS | Mod: CPTII,S$GLB,, | Performed by: PODIATRIST

## 2022-01-20 PROCEDURE — 73630 X-RAY EXAM OF FOOT: CPT | Mod: 26,RT,, | Performed by: RADIOLOGY

## 2022-01-20 PROCEDURE — 99999 PR PBB SHADOW E&M-EST. PATIENT-LVL IV: CPT | Mod: PBBFAC,,, | Performed by: PODIATRIST

## 2022-01-20 PROCEDURE — 99999 PR PBB SHADOW E&M-EST. PATIENT-LVL IV: ICD-10-PCS | Mod: PBBFAC,,, | Performed by: PODIATRIST

## 2022-01-20 PROCEDURE — 3077F PR MOST RECENT SYSTOLIC BLOOD PRESSURE >= 140 MM HG: ICD-10-PCS | Mod: CPTII,S$GLB,, | Performed by: PODIATRIST

## 2022-01-20 PROCEDURE — 3078F PR MOST RECENT DIASTOLIC BLOOD PRESSURE < 80 MM HG: ICD-10-PCS | Mod: CPTII,S$GLB,, | Performed by: PODIATRIST

## 2022-01-20 PROCEDURE — 73630 X-RAY EXAM OF FOOT: CPT | Mod: TC,RT

## 2022-01-20 PROCEDURE — 3008F PR BODY MASS INDEX (BMI) DOCUMENTED: ICD-10-PCS | Mod: CPTII,S$GLB,, | Performed by: PODIATRIST

## 2022-01-20 PROCEDURE — 1159F PR MEDICATION LIST DOCUMENTED IN MEDICAL RECORD: ICD-10-PCS | Mod: CPTII,S$GLB,, | Performed by: PODIATRIST

## 2022-01-20 PROCEDURE — 1101F PT FALLS ASSESS-DOCD LE1/YR: CPT | Mod: CPTII,S$GLB,, | Performed by: PODIATRIST

## 2022-01-20 PROCEDURE — 1125F AMNT PAIN NOTED PAIN PRSNT: CPT | Mod: CPTII,S$GLB,, | Performed by: PODIATRIST

## 2022-01-20 PROCEDURE — 1125F PR PAIN SEVERITY QUANTIFIED, PAIN PRESENT: ICD-10-PCS | Mod: CPTII,S$GLB,, | Performed by: PODIATRIST

## 2022-01-20 PROCEDURE — 1159F MED LIST DOCD IN RCRD: CPT | Mod: CPTII,S$GLB,, | Performed by: PODIATRIST

## 2022-01-20 PROCEDURE — 29540 PR STRAPPING; ANKLE &/OR FOOT: ICD-10-PCS | Mod: RT,S$GLB,, | Performed by: PODIATRIST

## 2022-01-20 PROCEDURE — 3078F DIAST BP <80 MM HG: CPT | Mod: CPTII,S$GLB,, | Performed by: PODIATRIST

## 2022-01-20 PROCEDURE — 3288F FALL RISK ASSESSMENT DOCD: CPT | Mod: CPTII,S$GLB,, | Performed by: PODIATRIST

## 2022-01-20 PROCEDURE — 99214 OFFICE O/P EST MOD 30 MIN: CPT | Mod: 25,S$GLB,, | Performed by: PODIATRIST

## 2022-01-20 PROCEDURE — 29540 STRAPPING ANKLE &/FOOT: CPT | Mod: RT,S$GLB,, | Performed by: PODIATRIST

## 2022-01-20 PROCEDURE — 1101F PR PT FALLS ASSESS DOC 0-1 FALLS W/OUT INJ PAST YR: ICD-10-PCS | Mod: CPTII,S$GLB,, | Performed by: PODIATRIST

## 2022-01-20 RX ORDER — LIDOCAINE HYDROCHLORIDE 20 MG/ML
JELLY TOPICAL
Qty: 30 ML | Refills: 2 | Status: SHIPPED | OUTPATIENT
Start: 2022-01-20

## 2022-01-20 NOTE — PROGRESS NOTES
Subjective:      Patient ID: Almedia J Phoenix is a 71 y.o. female.    Chief Complaint: Follow-up    Sharp deep pain top of the big toe joint and medial arch right foot indicated with index finger.  Rapid onset yesterday dropping her cane and having it hit her foot.  Aggravated with increased weight-bearing and some shoes.  No change since onset.  No prior medical treatment.  No self-treatment.  She wears a slipper today  Review of Systems   Constitutional: Negative for chills, diaphoresis, fever, malaise/fatigue and night sweats.   Cardiovascular: Negative for claudication, cyanosis, leg swelling and syncope.   Skin: Negative for color change, dry skin, nail changes, rash, suspicious lesions and unusual hair distribution.   Musculoskeletal: Positive for joint pain. Negative for falls, joint swelling, muscle cramps, muscle weakness and stiffness.   Gastrointestinal: Negative for constipation, diarrhea, nausea and vomiting.   Neurological: Negative for brief paralysis, disturbances in coordination, focal weakness, numbness, paresthesias, sensory change and tremors.           Objective:      Physical Exam  Constitutional:       Appearance: She is well-developed and well-nourished. She is not diaphoretic.      Comments: Oriented to time, place, and person.   Cardiovascular:      Pulses:           Dorsalis pedis pulses are 1+ on the right side and 1+ on the left side.        Posterior tibial pulses are 1+ on the right side and 1+ on the left side.      Comments: Capillary fill time 3-5 seconds.  All toes warm to touch.      Negative lower extremity edema bilateral.    Negative elevational pallor and dependent rubor bilateral.    Musculoskeletal:      Comments: Normal angle, base, station of gait. Decreased stride length, early heel off, moderately propulsive toe off bilateral.    All ten toes without clubbing, cyanosis, or signs of ischemia.      Pain to palpation of the dorsomedial aspect of 1st metatarsophalangeal  joint right and along the medial arch right without deformity, loss of function, signs of acute trauma right foot.    Range of motion, stability, muscle strength, and muscle tone are age and health appropriate normal bilateral feet and legs.       Lymphadenopathy:      Comments: Negative lymphadenopathy bilateral popliteal fossa and tarsal tunnel.  Negative lymphangitic streaking bilateral foot/ankle bilateral.     Skin:     General: Skin is warm, dry and intact.      Coloration: Skin is not pale.      Findings: No abrasion, bruising, burn, ecchymosis, erythema, laceration, lesion, petechiae or rash.      Nails: There is no clubbing or cyanosis.      Comments: Skin thin, atrophic, with decreased density and distribution of pedal hair bilateral, but without hyperpigmentation, dorothy discoloration,  ulcers, masses, nodules or cords palpated bilateral feet and legs.      Toenails 1st, 2nd, 3rd, 4th, 5th  bilateral are hypertrophic thickened 2-3 mm, dystrophic, discolored tanish brown with tan, gray crumbly subungual debris.  Tender to distal nail plate pressure, without periungual skin abnormality of each.     Neurological:      Mental Status: She is alert and oriented to person, place, and time. She is not disoriented.      Motor: No tremor, atrophy or abnormal muscle tone.      Deep Tendon Reflexes: Strength normal.      Comments: Negative tinel sign to percussion sural, superficial peroneal, deep peroneal, saphenous, and posterior tibial nerves right and left ankles and feet.    Negative allodynia both feet   Psychiatric:         Behavior: Behavior is cooperative.               Assessment:       Encounter Diagnoses   Name Primary?    Contusion of right foot, initial encounter Yes    Foot pain, right     Strain of foot, right, initial encounter          Plan:       Sheldon was seen today for follow-up.    Diagnoses and all orders for this visit:    Contusion of right foot, initial encounter  -     X-Ray Foot  Complete Right; Future    Foot pain, right  -     X-Ray Foot Complete Right; Future    Strain of foot, right, initial encounter  -     X-Ray Foot Complete Right; Future    Other orders  -     LIDOcaine HCL 2% (XYLOCAINE) 2 % jelly; Apply topically as needed (Pain). Apply topically once daily approximately 30 minutes before bedtime to affected area of foot/feet.      I counseled the patient on her conditions, their implications and medical management.        I applied a plantar rest strapping to the patient's right foot to offload symptomatic area, support the arch, and relieve pain.  \  Continue minimal ambulation with cane for gait assist for stability in slipper right to accommodate as needed.    Patient should return to normal lace-up shoes to her tolerance as soon as symptoms permit.    X-rays right foot.    Lidocaine gel          No follow-ups on file.

## 2022-02-03 ENCOUNTER — OFFICE VISIT (OUTPATIENT)
Dept: PODIATRY | Facility: CLINIC | Age: 72
End: 2022-02-03
Payer: MEDICARE

## 2022-02-03 VITALS
DIASTOLIC BLOOD PRESSURE: 64 MMHG | HEART RATE: 88 BPM | BODY MASS INDEX: 25.2 KG/M2 | HEIGHT: 59 IN | SYSTOLIC BLOOD PRESSURE: 136 MMHG | WEIGHT: 125 LBS

## 2022-02-03 DIAGNOSIS — S96.911A STRAIN OF FOOT, RIGHT, INITIAL ENCOUNTER: ICD-10-CM

## 2022-02-03 DIAGNOSIS — M79.671 FOOT PAIN, RIGHT: ICD-10-CM

## 2022-02-03 DIAGNOSIS — S90.31XA CONTUSION OF RIGHT FOOT, INITIAL ENCOUNTER: Primary | ICD-10-CM

## 2022-02-03 PROCEDURE — 3075F PR MOST RECENT SYSTOLIC BLOOD PRESS GE 130-139MM HG: ICD-10-PCS | Mod: CPTII,S$GLB,, | Performed by: PODIATRIST

## 2022-02-03 PROCEDURE — 99999 PR PBB SHADOW E&M-EST. PATIENT-LVL IV: ICD-10-PCS | Mod: PBBFAC,,, | Performed by: PODIATRIST

## 2022-02-03 PROCEDURE — 1125F PR PAIN SEVERITY QUANTIFIED, PAIN PRESENT: ICD-10-PCS | Mod: CPTII,S$GLB,, | Performed by: PODIATRIST

## 2022-02-03 PROCEDURE — 1160F RVW MEDS BY RX/DR IN RCRD: CPT | Mod: CPTII,S$GLB,, | Performed by: PODIATRIST

## 2022-02-03 PROCEDURE — 99999 PR PBB SHADOW E&M-EST. PATIENT-LVL IV: CPT | Mod: PBBFAC,,, | Performed by: PODIATRIST

## 2022-02-03 PROCEDURE — 3078F PR MOST RECENT DIASTOLIC BLOOD PRESSURE < 80 MM HG: ICD-10-PCS | Mod: CPTII,S$GLB,, | Performed by: PODIATRIST

## 2022-02-03 PROCEDURE — 29540 STRAPPING ANKLE &/FOOT: CPT | Mod: RT,S$GLB,, | Performed by: PODIATRIST

## 2022-02-03 PROCEDURE — 1159F MED LIST DOCD IN RCRD: CPT | Mod: CPTII,S$GLB,, | Performed by: PODIATRIST

## 2022-02-03 PROCEDURE — 29540 PR STRAPPING; ANKLE &/OR FOOT: ICD-10-PCS | Mod: RT,S$GLB,, | Performed by: PODIATRIST

## 2022-02-03 PROCEDURE — 1101F PT FALLS ASSESS-DOCD LE1/YR: CPT | Mod: CPTII,S$GLB,, | Performed by: PODIATRIST

## 2022-02-03 PROCEDURE — 3008F PR BODY MASS INDEX (BMI) DOCUMENTED: ICD-10-PCS | Mod: CPTII,S$GLB,, | Performed by: PODIATRIST

## 2022-02-03 PROCEDURE — 1160F PR REVIEW ALL MEDS BY PRESCRIBER/CLIN PHARMACIST DOCUMENTED: ICD-10-PCS | Mod: CPTII,S$GLB,, | Performed by: PODIATRIST

## 2022-02-03 PROCEDURE — 3008F BODY MASS INDEX DOCD: CPT | Mod: CPTII,S$GLB,, | Performed by: PODIATRIST

## 2022-02-03 PROCEDURE — 3288F FALL RISK ASSESSMENT DOCD: CPT | Mod: CPTII,S$GLB,, | Performed by: PODIATRIST

## 2022-02-03 PROCEDURE — 99212 OFFICE O/P EST SF 10 MIN: CPT | Mod: 25,S$GLB,, | Performed by: PODIATRIST

## 2022-02-03 PROCEDURE — 1101F PR PT FALLS ASSESS DOC 0-1 FALLS W/OUT INJ PAST YR: ICD-10-PCS | Mod: CPTII,S$GLB,, | Performed by: PODIATRIST

## 2022-02-03 PROCEDURE — 3288F PR FALLS RISK ASSESSMENT DOCUMENTED: ICD-10-PCS | Mod: CPTII,S$GLB,, | Performed by: PODIATRIST

## 2022-02-03 PROCEDURE — 1159F PR MEDICATION LIST DOCUMENTED IN MEDICAL RECORD: ICD-10-PCS | Mod: CPTII,S$GLB,, | Performed by: PODIATRIST

## 2022-02-03 PROCEDURE — 1125F AMNT PAIN NOTED PAIN PRSNT: CPT | Mod: CPTII,S$GLB,, | Performed by: PODIATRIST

## 2022-02-03 PROCEDURE — 99212 PR OFFICE/OUTPT VISIT, EST, LEVL II, 10-19 MIN: ICD-10-PCS | Mod: 25,S$GLB,, | Performed by: PODIATRIST

## 2022-02-03 PROCEDURE — 3075F SYST BP GE 130 - 139MM HG: CPT | Mod: CPTII,S$GLB,, | Performed by: PODIATRIST

## 2022-02-03 PROCEDURE — 3078F DIAST BP <80 MM HG: CPT | Mod: CPTII,S$GLB,, | Performed by: PODIATRIST

## 2022-02-03 NOTE — PROGRESS NOTES
Subjective:      Patient ID: Almedia J Phoenix is a 71 y.o. female.    Chief Complaint: Follow-up    Sharp deep pain top of the big toe joint and medial arch right foot indicated with index finger.  Rapid onset yesterday dropping her cane and having it hit her foot.  Aggravated with increased weight-bearing and some shoes.  Improved significantly with strapping surgical shoe and cane assist ambulation..    Review of Systems   Constitutional: Negative for chills, diaphoresis, fever, malaise/fatigue and night sweats.   Cardiovascular: Negative for claudication, cyanosis, leg swelling and syncope.   Skin: Negative for color change, dry skin, nail changes, rash, suspicious lesions and unusual hair distribution.   Musculoskeletal: Positive for joint pain. Negative for falls, joint swelling, muscle cramps, muscle weakness and stiffness.   Gastrointestinal: Negative for constipation, diarrhea, nausea and vomiting.   Neurological: Negative for brief paralysis, disturbances in coordination, focal weakness, numbness, paresthesias, sensory change and tremors.           Objective:      Physical Exam  Constitutional:       Appearance: She is well-developed. She is not diaphoretic.      Comments: Oriented to time, place, and person.   Cardiovascular:      Pulses:           Dorsalis pedis pulses are 1+ on the right side and 1+ on the left side.        Posterior tibial pulses are 1+ on the right side and 1+ on the left side.      Comments: Capillary fill time 3-5 seconds.  All toes warm to touch.      Negative lower extremity edema bilateral.    Negative elevational pallor and dependent rubor bilateral.    Musculoskeletal:      Comments: Normal angle, base, station of gait. Decreased stride length, early heel off, moderately propulsive toe off bilateral.    All ten toes without clubbing, cyanosis, or signs of ischemia.      Modest residual pain to palpation of the dorsomedial aspect of 1st metatarsophalangeal joint right and along  the medial arch right without deformity, loss of function, signs of acute trauma right foot.    Range of motion, stability, muscle strength, and muscle tone are age and health appropriate normal bilateral feet and legs.       Lymphadenopathy:      Comments: Negative lymphadenopathy bilateral popliteal fossa and tarsal tunnel.  Negative lymphangitic streaking bilateral foot/ankle bilateral.     Skin:     General: Skin is warm and dry.      Coloration: Skin is not pale.      Findings: No abrasion, bruising, burn, ecchymosis, erythema, laceration, lesion, petechiae or rash.      Nails: There is no clubbing.      Comments: Skin thin, atrophic, with decreased density and distribution of pedal hair bilateral, but without hyperpigmentation, dorothy discoloration,  ulcers, masses, nodules or cords palpated bilateral feet and legs.      Toenails 1st, 2nd, 3rd, 4th, 5th  bilateral are hypertrophic thickened 2-3 mm, dystrophic, discolored tanish brown with tan, gray crumbly subungual debris.  Tender to distal nail plate pressure, without periungual skin abnormality of each.     Neurological:      Mental Status: She is alert and oriented to person, place, and time. She is not disoriented.      Motor: No tremor, atrophy or abnormal muscle tone.      Comments: Negative tinel sign to percussion sural, superficial peroneal, deep peroneal, saphenous, and posterior tibial nerves right and left ankles and feet.    Negative allodynia both feet   Psychiatric:         Behavior: Behavior is cooperative.               Assessment:       Encounter Diagnoses   Name Primary?    Contusion of right foot, initial encounter Yes    Foot pain, right     Strain of foot, right, initial encounter          Plan:       Sheldon was seen today for follow-up.    Diagnoses and all orders for this visit:    Contusion of right foot, initial encounter    Foot pain, right    Strain of foot, right, initial encounter      I counseled the patient on her  conditions, their implications and medical management.        I applied a plantar rest strapping to the patient's right foot to offload symptomatic area, support the arch, and relieve pain.    Continue minimal ambulation with cane for gait assist for stability in slipper right to accommodate as needed.    Patient should return to normal lace-up shoes to her tolerance as soon as symptoms permit.              Follow up if symptoms worsen or fail to improve.

## 2022-07-04 ENCOUNTER — HOSPITAL ENCOUNTER (EMERGENCY)
Facility: OTHER | Age: 72
Discharge: HOME | End: 2022-07-04
Attending: EMERGENCY MEDICINE
Payer: MEDICARE

## 2022-07-04 VITALS
WEIGHT: 124 LBS | DIASTOLIC BLOOD PRESSURE: 71 MMHG | TEMPERATURE: 98 F | RESPIRATION RATE: 20 BRPM | SYSTOLIC BLOOD PRESSURE: 160 MMHG | BODY MASS INDEX: 25.04 KG/M2 | HEART RATE: 102 BPM | OXYGEN SATURATION: 98 %

## 2022-07-04 DIAGNOSIS — S00.83XA CONTUSION OF FACE, INITIAL ENCOUNTER: ICD-10-CM

## 2022-07-04 DIAGNOSIS — R07.89 CHEST WALL PAIN: ICD-10-CM

## 2022-07-04 DIAGNOSIS — T07.XXXA MULTIPLE CONTUSIONS: Primary | ICD-10-CM

## 2022-07-04 PROCEDURE — 99284 EMERGENCY DEPT VISIT MOD MDM: CPT | Mod: 25

## 2022-07-04 PROCEDURE — 25000003 PHARM REV CODE 250: Performed by: EMERGENCY MEDICINE

## 2022-07-04 RX ORDER — NAPROXEN 500 MG/1
500 TABLET ORAL 2 TIMES DAILY PRN
Qty: 60 TABLET | Refills: 0 | Status: SHIPPED | OUTPATIENT
Start: 2022-07-04

## 2022-07-04 RX ORDER — LIDOCAINE 50 MG/G
PATCH TOPICAL
Qty: 30 PATCH | Refills: 0 | Status: SHIPPED | OUTPATIENT
Start: 2022-07-04

## 2022-07-04 RX ORDER — DICLOFENAC SODIUM 10 MG/G
2 GEL TOPICAL 3 TIMES DAILY PRN
Qty: 100 G | Refills: 0 | Status: SHIPPED | OUTPATIENT
Start: 2022-07-04

## 2022-07-04 RX ORDER — NAPROXEN 500 MG/1
500 TABLET ORAL
Status: COMPLETED | OUTPATIENT
Start: 2022-07-04 | End: 2022-07-04

## 2022-07-04 RX ADMIN — NAPROXEN 500 MG: 500 TABLET ORAL at 05:07

## 2022-07-04 NOTE — ED TRIAGE NOTES
72 y/o female presents to ED after falling off of a rail on front porch. Denies hitting head, reports glasses pressing against face during fall, small bump noted to L cheek bone, denies LOC. AAOx4. Takes blood thinners.

## 2022-07-04 NOTE — ED PROVIDER NOTES
"  Source of History:  Medical record, patient    Chief complaint:  Per triage note: "Fall (Fell over banister from porch stairs and landed on large bag, denies loc, small lac & bruising to left eye, pain to left wrist, pain rt ankle & right ribs; on ASA daily)  "    HPI:    Patient presents to the ED s/p falling from her porch stairs onto a large bag just PTA. She denies any significant pain and denies syncope. She notes mild swelling to her left periorbital area. She also reports mild pain to her left scapular region, the lateral aspect of her left wrist, and her right ankle.     This is the extent of the patient's complaints at this time.     ROS:   As per HPI and below:   General: No fever.   HENT: No facial pain.   Eyes: No eye pain.   Cardiovascular: No chest pain.   Respiratory:  No dyspnea.   GI: No abdominal pain. No nausea. No vomiting. No diarrhea.   Skin: No rashes. Notes mild swelling to left periorbital area.   Neuro:  No syncope.  No focal deficits.   Musculoskeletal: Mild pain of left scapula, left wrist, and right ankle.   All other systems reviewed and are negative.      Review of patient's allergies indicates:   Allergen Reactions    Lisinopril      swelling       PMH:  As per HPI and below:  Past Medical History:   Diagnosis Date    GERD (gastroesophageal reflux disease)     Hypertension        Past Surgical History:   Procedure Laterality Date    CHOLECYSTECTOMY      HYSTERECTOMY         Social History     Tobacco Use    Smoking status: Never Smoker    Smokeless tobacco: Never Used   Substance Use Topics    Alcohol use: No    Drug use: No       Physical Exam:      Nursing note and vitals reviewed.  BP (!) 160/71 (BP Location: Left arm, Patient Position: Sitting)   Pulse 102   Temp 98 °F (36.7 °C) (Oral)   Resp 20   Wt 56.2 kg (124 lb)   SpO2 98%   Breastfeeding No   BMI 25.04 kg/m²   Constitutional: AAOx3. Well-developed and well-nourished. No distress. Pleasant and jovial " affect.  HENT:   Mouth/Throat: Oropharynx is clear and moist.  Eyes: Pupils are equal, round, and reactive to light. No discharge. Anicteric. Tiny partial thickness to left periorbital area with surrounding edema and contusion.   Neck: Normal range of motion. Neck supple. No midline tenderness, stepoffs, or deformities.  Cardiovascular: Normal rate.  Pulmonary/Chest: Effort normal.  Abdominal: Soft. Bowel sounds normal. No distension and no mass. There is no tenderness. There is no rebound, no guarding.  Musculoskeletal: Normal range of motion. No midline spinal tenderness. No stepoffs or deformities. + paraspinal tenderness and spasm.   Neurological: Alert and oriented to person, place, and time. No gross cranial nerve deficit. Coordination normal. No UE/LE light  touch or strength deficits. Able to do deep knee bend, stand on heels and toes. Normal gait.   Skin: Skin is warm and dry.   Ext: 2+ radial pulses  Psychiatric: Behavior is normal. Judgment normal.  Left Wrist: Skin intact. 2+ pulses, cap refill <2sec. FROM at shoulder/elbow. wrist ROM limited by pain. 5/5 Flexion and extension, finger flexion / extension, finger abduction / adduction, thumb opposition. No snuffbox tenderness. Light touch intact in median / radial / ulnar distributions. Compartments soft.   Right Ankle: mild edema, skin intact, FROM, no focal tenderness at anterior ankle, no 5th metatarsal tenderness, no focal midfoot tenderness, no tenderness along distal 6cm of tibia or fibula, no medial or lateral malleolar point tenderness, no pain on tibial press, no proximal tibial tenderness, neurovascularly intact distally. Compartments soft. Normal capillary refill. No ipsilateral knee joint tenderness. Ipsilateral knee with FROM.        MDM:    I decided to obtain the patient's medical records.    ED Course as of 07/14/22 1217   Mon Jul 04, 2022 1732 Patient is a 71-year-old female with hypertension, GERD, who presents with facial contusions,  left wrist soreness, right ankle soreness, left posterior chest wall soreness after tumbling over a banister earlier today. She states she came for evaluation upon the insistence of her granddaughter.  She states that her symptoms are mild.  She denies any trouble ambulating, in the states that her granddaughter complained that she was continuously walking since the fall and would not sit down.   Exam patient has left lateral periorbital contusion with to minor scratches she attributes to her classes, no other facial tenderness, epistaxis, no midline spinal tenderness, no focal joint tenderness at her affected wrist, ankle, chest wall.  She had clear and equal breath sounds bilaterally.  Initial differential included contusions, rib fracture, intracranial injury, facial fracture, cervical spine injury, pneumothorax.  I doubt wrist or ankle fracture or dislocation.  If no acute findings on imaging, I anticipate discharge with maximal supportive care, ortho f/u.  [RC]      ED Course User Index  [RC] Juanjose Javier MD       Medications   naproxen tablet 500 mg (500 mg Oral Given 7/4/22 6809)          X-Ray Chest 1 View   Final Result      No acute process.         Electronically signed by: Rich Olsen MD   Date:    07/04/2022   Time:    18:00      CT Cervical Spine Without Contrast   Final Result      No evidence of acute cervical spine fracture or dislocation.         Electronically signed by: Khari Snyder MD   Date:    07/04/2022   Time:    18:13      CT Maxillofacial Without Contrast   Final Result      No acute facial fractures identified.         Electronically signed by: hKari Snyder MD   Date:    07/04/2022   Time:    18:21      CT Head Without Contrast   Final Result      No acute intracranial process.         Electronically signed by: Harish Perales   Date:    07/04/2022   Time:    17:43            I, Mike Diaz, scribed for, and in the presence of, Dr. Javier. I performed the scribed service and  the documentation accurately describes the services I performed. I attest to the accuracy of the note.     Physician Attestation for Scribe:   I, Oralia Mckeon MD, reviewed documentation as scribed in my presence, which is both accurate and complete.    Diagnostic Impression:    1. Multiple contusions    2. Chest wall pain    3. Contusion of face, initial encounter         ED Disposition Condition    Discharge Stable        ED Prescriptions     Medication Sig Dispense Start Date End Date Auth. Provider    naproxen (NAPROSYN) 500 MG tablet Take 1 tablet (500 mg total) by mouth 2 (two) times daily as needed (pain). 60 tablet 7/4/2022  Juanjose Javier MD    LIDOcaine (LIDODERM) 5 % Apply to affected area as needed for pain for 12 hours, then off for 12 hours. Discard after each use.  May use 4% lidocaine patch as alternative. 30 patch 7/4/2022  Juanjose Javier MD    diclofenac sodium (VOLTAREN) 1 % Gel Apply 2 g topically 3 (three) times daily as needed (muscle spasm pain). Apply 2 grams to affected area 3 times daily as needed 100 g 7/4/2022  Juanjose Javier MD        Follow-up Information     Follow up With Specialties Details Why Contact Info    Your primary doctor  In 2 days For recheck with your primary care doctor     [facility]  In 1 week For recheck if having concussion symptoms in 1 week [id number]  [facility] [address]  [phone]               Juanjose Javier MD  07/14/22 1594

## 2022-07-04 NOTE — DISCHARGE INSTRUCTIONS
Thank you for letting us take care of you today! It was nice meeting you, and I hope you feel better soon.     Call your primary care doctor to make the first available appointment.     Keep all your medical appointments.     Take your regular medication as prescribed. Contact your primary care provider before running out of medication, or for any problems obtaining them.    Do not drive or operate heavy machinery while taking opioid or muscle relaxing medications. Examples include norco, percocet, xanax, valium, flexeril.     Overuse or long term use of pain and sedating medication may lead to addiction, dependence, organ failure, family and work problems, legal problems, accidental overdose and death.    If you do not have health insurance, you probably can afford it:  Call [phone] (Critical access hospital hotline) or go to www.[place].gov    Your evaluation in the ED does not suggest any emergent or life threatening medical condition requiring admission or immediate intervention beyond that provided in the ED.   However, the signs of a serious problem sometimes take more time to appear.     Do not hesitate to return to the ER if any of the following occur:    Weakness, dizziness, fainting, or loss of consciousness   Fever of 100.4ºF (38ºC) or higher  Any worse symptoms  Any new or concerning symptoms    Sincerely,   Juanjose Javier MD  Board Certified Emergency Physician    To protect yourself and others from COVID19:  Get vaccinated.   Anyone over 5 years old is eligible for vaccination.   Everyone 18 and older should get 3 total vaccine doses. Anyone over 50 years old or with certain chronic conditions should get a 4th dose.   Vaccination is shown to prevent getting sick, ending up in the hospital, or dying because of COVID19.   If you are vaccinated, help friends and family get the vaccine.    If not vaccinated:  Your shot is waiting for you. To get it:   Text your ZIP code to GETVAX ([id number]) or VACUNA ([id number]) in  [place]  call 311, or [phone], or [phone], or [phone],   go to www.vaccines.gov, or  Call your health provider  If exposed to someone with cold, flu, or COVID19 symptoms, you must quarantine for at least 5 days.   Even if you have no symptoms   Otherwise you could give the virus to someone who dies from it  Some symptoms of COVID19 include fever, cough, sore throat, breathing troubles, loss of taste/smell, headaches, stomach upset, diarrhea.   Each household can get 4 free COVID19 test kits, even if you already got one set. Go to www.covidtests.gov      Contusion is the medical term for a bruise. It is the result of a direct blow or an impact, such as a fall. Contusions are common sports injuries.    Most people think of a bruise as a black-and-blue spot. This happens when small blood vessels get torn and leak blood under the skin. But bones, muscles, and organs can also get bruised. This may damage deep tissues but not cause a bruise you can see.    The doctor will do a physical exam to find the location of your contusion. You may also have tests to make sure you do not have a more serious injury, such as a broken bone or nerve damage. These may include X-rays or other imaging tests like a CT scan or MRI.    Deep-tissue contusions may cause pain and swelling. But if there is no serious damage, they will often get better in a few weeks with home treatment.    The doctor has checked you carefully, but problems can develop later. If you notice any problems or new symptoms, get medical treatment right away.    Follow-up care is a key part of your treatment and safety. Be sure to make and go to all appointments, and call your doctor if you are having problems. It's also a good idea to know your test results and keep a list of the medicines you take.    How can you care for yourself at home?  Put ice or a cold pack on the sore area for 10 to 20 minutes at a time to stop swelling. Put a thin cloth between  the ice pack and your skin.  Be safe with medicines. Read and follow all instructions on the label.  If the doctor gave you a prescription medicine for pain, take it as prescribed.  If you are not taking a prescription pain medicine, ask your doctor if you can take an over-the-counter medicine.  If you can, prop up the sore area on pillows as much as possible for the next few days. Try to keep the sore area above the level of your heart.

## 2023-01-04 ENCOUNTER — HOSPITAL ENCOUNTER (EMERGENCY)
Facility: OTHER | Age: 73
Discharge: HOME | End: 2023-01-04
Attending: EMERGENCY MEDICINE
Payer: MEDICARE

## 2023-01-04 VITALS
TEMPERATURE: 97 F | SYSTOLIC BLOOD PRESSURE: 136 MMHG | DIASTOLIC BLOOD PRESSURE: 73 MMHG | RESPIRATION RATE: 16 BRPM | HEART RATE: 91 BPM | WEIGHT: 124 LBS | BODY MASS INDEX: 25 KG/M2 | HEIGHT: 59 IN | OXYGEN SATURATION: 95 %

## 2023-01-04 DIAGNOSIS — M65.20 CALCIFIC TENDONITIS: Primary | ICD-10-CM

## 2023-01-04 DIAGNOSIS — R52 PAIN: ICD-10-CM

## 2023-01-04 DIAGNOSIS — M77.9 BONE SPUR: ICD-10-CM

## 2023-01-04 PROCEDURE — 99283 EMERGENCY DEPT VISIT LOW MDM: CPT | Mod: 25

## 2023-01-04 RX ORDER — TRAMADOL HYDROCHLORIDE 50 MG/1
50 TABLET ORAL EVERY 4 HOURS PRN
Qty: 12 TABLET | Refills: 0 | Status: SHIPPED | OUTPATIENT
Start: 2023-01-04 | End: 2023-01-07

## 2023-01-04 RX ORDER — DEXAMETHASONE SODIUM PHOSPHATE 4 MG/ML
8 INJECTION, SOLUTION INTRA-ARTICULAR; INTRALESIONAL; INTRAMUSCULAR; INTRAVENOUS; SOFT TISSUE
Status: DISCONTINUED | OUTPATIENT
Start: 2023-01-04 | End: 2023-01-04 | Stop reason: HOSPADM

## 2023-01-04 RX ORDER — TRAMADOL HYDROCHLORIDE 50 MG/1
50 TABLET ORAL
Status: DISCONTINUED | OUTPATIENT
Start: 2023-01-04 | End: 2023-01-04 | Stop reason: HOSPADM

## 2023-01-04 NOTE — ED PROVIDER NOTES
Encounter Date: 1/4/2023    SCRIBE #1 NOTE: I, Mike Joe, am scribing for, and in the presence of,  Brayden Ivory MD.     History     Chief Complaint   Patient presents with    Arm Pain     Endorses right elbow pain. Endorses history of arthritis. Endorses using otc meds with no relief today.     Time seen by provider: 6:35 AM    This is a 72 y.o. female who presents with complaint of right elbow pain for the past 2 days. The patient denies any recent falls or injuries to her elbow but she notes she has a history of arthritis. She describes the pain as shooting and she also notes that she feels pain with extension of her right elbow. She states she attempted to see her PCP but was unable to schedule an appointment soon. She denies any recent medications changes and any history of gout.     The history is provided by the patient.   Review of patient's allergies indicates:   Allergen Reactions    Lisinopril      swelling     Past Medical History:   Diagnosis Date    GERD (gastroesophageal reflux disease)     Hypertension      Past Surgical History:   Procedure Laterality Date    CHOLECYSTECTOMY      HYSTERECTOMY       No family history on file.  Social History     Tobacco Use    Smoking status: Never    Smokeless tobacco: Never   Substance Use Topics    Alcohol use: No    Drug use: No     Review of Systems   Constitutional:  Negative for chills and fever.   HENT:  Negative for congestion and sore throat.    Eyes:  Negative for photophobia and redness.   Respiratory:  Negative for cough and shortness of breath.    Cardiovascular:  Negative for chest pain.   Gastrointestinal:  Negative for abdominal pain, nausea and vomiting.   Genitourinary:  Negative for dysuria.   Musculoskeletal:  Positive for arthralgias (right elbow). Negative for back pain.   Skin:  Negative for rash.   Neurological:  Negative for weakness, light-headedness and headaches.   Psychiatric/Behavioral:  Negative for confusion.       Physical Exam     Initial Vitals [01/04/23 0554]   BP Pulse Resp Temp SpO2   135/69 88 18 97.8 °F (36.6 °C) 99 %      MAP       --         Physical Exam    Nursing note and vitals reviewed.  Constitutional: She appears well-developed and well-nourished. She is not diaphoretic. No distress.   HENT:   Head: Normocephalic and atraumatic.   Mouth/Throat: Oropharynx is clear and moist and mucous membranes are normal.   Eyes: Conjunctivae and EOM are normal. Pupils are equal, round, and reactive to light. No scleral icterus.   Neck: Neck supple.   Normal range of motion.  Cardiovascular:  Normal rate, regular rhythm, S1 normal and S2 normal.           No murmur heard.  Pulses:       Radial pulses are 2+ on the right side.   Pulmonary/Chest: Breath sounds normal. No respiratory distress.   Abdominal: Abdomen is soft. Bowel sounds are normal. There is no abdominal tenderness.   Musculoskeletal:         General: No tenderness or edema. Normal range of motion.      Cervical back: Normal range of motion and neck supple.      Comments: No edema to right elbow. No deformity, crepitus, or step-offs. No septic joint.      Lymphadenopathy:     She has no cervical adenopathy.   Neurological: She is alert and oriented to person, place, and time.   5/5  strength to RUE.    Skin: Skin is warm and dry. Capillary refill takes less than 2 seconds. No rash noted. No pallor.   RUE with 2+ radial pulse, <2 second cap refill. Right elbow joint not hot to touch. No abscess formation or cellulitis.    Psychiatric: She has a normal mood and affect. Thought content normal.       ED Course   Procedures  Labs Reviewed - No data to display       Imaging Results              X-Ray Elbow Complete Right (Final result)  Result time 01/04/23 07:36:17      Final result by James Echevarria DO (01/04/23 07:36:17)                   Impression:      No acute osseous abnormality.      Electronically signed by: James  Swathi  Date:    01/04/2023  Time:    07:36               Narrative:    EXAMINATION:  XR ELBOW COMPLETE 3 VIEW RIGHT    CLINICAL HISTORY:  . Pain, unspecified    TECHNIQUE:  AP, lateral, and oblique views of the right elbow were performed.    COMPARISON:  None    FINDINGS:  No acute fracture or dislocation. Alignment is normal. Joint spaces are preserved. There is no elbow joint effusion.                                    X-Rays:   Independently Interpreted Readings:   Other Readings:  X-Ray Elbow Complete Right: Patient has osteoarthritic changes. Appears to have bone sparring and possible calcified tendons.   Medications - No data to display  Medical Decision Making:   History:   Old Medical Records: I decided to obtain old medical records.  Independently Interpreted Test(s):   I have ordered and independently interpreted X-rays - see prior notes.  Clinical Tests:   Radiological Study: Ordered and Reviewed        Scribe Attestation:   Scribe #1: I performed the above scribed service and the documentation accurately describes the services I performed. I attest to the accuracy of the note.    Attending Attestation:             Attending ED Notes:   Emergent evaluation of a 72-year-old female who presents to the emergency department with nontraumatic right elbow pain.  Patient is afebrile [facility] with stable vital signs.  Patient neurovascularly intact without focal neurologic deficits.  No deformity, crepitus or step-offs.  No clinical evidence of infection, cellulitis or abscess formation.  X-ray reveals no evidence of fracture or dislocation.  I do suspect the patient may have calcific tendinitis or bone spur visualized on x-ray.  The patient extensively counseled on her diagnosis and treatment including all diagnostic and physical exam findings.  The patient is discharged in good condition instructed to follow-up with orthopedics in the next 24-48 hours.        Physician Attestation for Scribe: I,  Brayden Meier, reviewed documentation as scribed in my presence, which is both accurate and complete.           Clinical Impression:   Final diagnoses:  [R52] Pain  [M65.20] Calcific tendonitis (Primary)  [M77.9] Bone spur        ED Disposition Condition    Discharge Good          ED Prescriptions       Medication Sig Dispense Start Date End Date Auth. Provider    traMADoL (ULTRAM) 50 mg tablet (Expires today) Take 1 tablet (50 mg total) by mouth every 4 (four) hours as needed for Pain. 12 tablet 1/4/2023 1/7/2023 Brayden Meier MD          Follow-up Information       Follow up With Specialties Details Why Contact Info    [facility] In 2 days  [address] [address]  [phone]             Brayden Meier MD  01/07/23 7046

## 2025-01-29 ENCOUNTER — TELEPHONE (OUTPATIENT)
Dept: PODIATRY | Facility: CLINIC | Age: 75
End: 2025-01-29
Payer: MEDICARE

## 2025-01-29 NOTE — TELEPHONE ENCOUNTER
Appointment scheduled for 01/30 at 7a.    Patient verbalized understanding.      ----- Message from Maren sent at 1/29/2025  1:33 PM CST -----  Regarding: Return Call    Who Called:  Patient      Who Left Message for Patient:  Raven      Does the patient know what this is regarding?  Returning Call        Best Call Back Number: [phone]         Additional Information: Patient is returning a call.  Please assist.

## 2025-01-29 NOTE — TELEPHONE ENCOUNTER
Left message for patient to callback.    ----- Message from María sent at 1/29/2025  9:43 AM CST -----  Type : Patient Call    Who Called : Patient     Does the patient know what this is regarding?: Patient is wanting to schedule an appt; pt says she having some pain in her right foot.      Would the patient rather a call back or a response via [facility]? Call      Best Call Back Number: [phone]        Additional Information:

## 2025-01-30 ENCOUNTER — OFFICE VISIT (OUTPATIENT)
Dept: PODIATRY | Facility: CLINIC | Age: 75
End: 2025-01-30
Payer: MEDICAID

## 2025-01-30 ENCOUNTER — APPOINTMENT (OUTPATIENT)
Dept: RADIOLOGY | Facility: OTHER | Age: 75
End: 2025-01-30
Attending: PODIATRIST
Payer: MEDICARE

## 2025-01-30 VITALS — WEIGHT: 123.88 LBS | HEIGHT: 59 IN | BODY MASS INDEX: 24.97 KG/M2

## 2025-01-30 DIAGNOSIS — M25.579 SINUS TARSITIS, UNSPECIFIED LATERALITY: Primary | ICD-10-CM

## 2025-01-30 DIAGNOSIS — M25.579 SINUS TARSITIS, UNSPECIFIED LATERALITY: ICD-10-CM

## 2025-01-30 DIAGNOSIS — M79.671 FOOT PAIN, RIGHT: ICD-10-CM

## 2025-01-30 DIAGNOSIS — M21.40 HYPERMOBILE FLAT FOOT, UNSPECIFIED LATERALITY: ICD-10-CM

## 2025-01-30 PROCEDURE — 99214 OFFICE O/P EST MOD 30 MIN: CPT | Mod: PBBFAC,PN | Performed by: PODIATRIST

## 2025-01-30 PROCEDURE — 29540 STRAPPING ANKLE &/FOOT: CPT | Mod: PBBFAC,PN,RT | Performed by: PODIATRIST

## 2025-01-30 PROCEDURE — 73630 X-RAY EXAM OF FOOT: CPT | Mod: 26,RT,, | Performed by: RADIOLOGY

## 2025-01-30 PROCEDURE — 73630 X-RAY EXAM OF FOOT: CPT | Mod: TC,PN,RT

## 2025-01-30 PROCEDURE — 99999 PR PBB SHADOW E&M-EST. PATIENT-LVL IV: CPT | Mod: PBBFAC,,, | Performed by: PODIATRIST

## 2025-01-30 RX ORDER — LIDOCAINE AND PRILOCAINE 25; 25 MG/G; MG/G
CREAM TOPICAL
Qty: 30 G | Refills: 3 | Status: SHIPPED | OUTPATIENT
Start: 2025-01-30

## 2025-01-30 NOTE — PROGRESS NOTES
Subjective:      Patient ID: Almedia J Phoenix is a 74 y.o. female.    Chief Complaint: Flat Foot (R foot)    Sharp deep burning pain left lateral hindfoot extending around the lateral ankle and slightly proximally indicated with index finger.  This has been gradual onset, worsening over the past 3-4 weeks.  Aggravated with increased weight-bearing prolonged standing some shoes.  No prior medical treatment.  No self-treatment.  Denies trauma and surgery right foot    Review of Systems   Constitutional: Negative for chills, diaphoresis, fever, malaise/fatigue and night sweats.   Cardiovascular:  Negative for claudication, cyanosis, leg swelling and syncope.   Skin:  Negative for color change, dry skin, nail changes, rash, suspicious lesions and unusual hair distribution.   Musculoskeletal:  Positive for joint pain. Negative for falls, joint swelling, muscle cramps, muscle weakness and stiffness.   Gastrointestinal:  Negative for constipation, diarrhea, nausea and vomiting.   Neurological:  Negative for brief paralysis, disturbances in coordination, focal weakness, numbness, paresthesias, sensory change and tremors.         Objective:      Physical Exam  Constitutional:       General: She is not in acute distress.     Appearance: She is well-developed. She is not diaphoretic.      Comments: Oriented to time, place, and person.   Cardiovascular:      Pulses:           Popliteal pulses are 2+ on the right side and 2+ on the left side.        Dorsalis pedis pulses are 2+ on the right side and 2+ on the left side.        Posterior tibial pulses are 2+ on the right side and 2+ on the left side.      Comments: Capillary refill 3 seconds all toes/distal feet, all toes/both feet warm to touch.      Negative lymphadenopathy bilateral popliteal fossa and tarsal tunnel.      Negavie lower extremity edema bilateral.    Musculoskeletal:      Right ankle: No swelling, deformity, ecchymosis or lacerations. Normal range of motion.  Normal pulse.      Right Achilles Tendon: Normal. No defects. Morgan's test negative.      Comments: Normal angle, base, station of gait. Decreased stride length, early heel off, moderately propulsive toe off bilateral.    All ten toes without clubbing, cyanosis, or signs of ischemia.      Intense deep pain to deep palpation of the area the sinus tarsi with the foot in eversion subtalar joint without gross deformity loss of function or signs of acute trauma.      There is mild tenderness over the distal 8 cm of the peroneus longus and brevis tendons without functional deficit.    Range of motion, stability, muscle strength, and muscle tone are age and health appropriate normal bilateral feet and legs.       Lymphadenopathy:      Lower Body: No right inguinal adenopathy. No left inguinal adenopathy.      Comments: Negative lymphadenopathy bilateral popliteal fossa and tarsal tunnel.    Negative lymphangitic streaking bilateral feet/ankles/legs.   Skin:     General: Skin is warm and dry.      Capillary Refill: Capillary refill takes 2 to 3 seconds.      Coloration: Skin is not pale.      Findings: No abrasion, bruising, burn, ecchymosis, erythema, laceration, lesion, petechiae or rash.      Nails: There is no clubbing.      Comments: Skin is normal age and health appropriate color, turgor, texture, and temperature bilateral lower extremities without ulceration, hyperpigmentation, discoloration, masses nodules or cords palpated.  No ecchymosis, erythema, edema, or cardinal signs of infection bilateral lower extremities.      Neurological:      Mental Status: She is alert and oriented to person, place, and time. She is not disoriented.      Sensory: No sensory deficit.      Motor: No tremor, atrophy or abnormal muscle tone.      Gait: Gait normal.      Deep Tendon Reflexes:      Reflex Scores:       Patellar reflexes are 2+ on the right side and 2+ on the left side.       Achilles reflexes are 2+ on the right side and  2+ on the left side.     Comments: Negative tinel sign to percussion sural, superficial peroneal, deep peroneal, saphenous, and posterior tibial nerves right and left ankles and feet.    Negative allodynia both feet   Psychiatric:         Behavior: Behavior is cooperative.           Assessment:       Encounter Diagnoses   Name Primary?    Sinus tarsitis, unspecified laterality - Left Foot Yes    Hypermobile flat foot, unspecified laterality     Foot pain, left          Plan:       Sheldon was seen today for flat foot.    Diagnoses and all orders for this visit:    Sinus tarsitis, unspecified laterality - Left Foot  -     X-Ray Foot Complete Right; Future  -     X-Ray Ankle Complete Right; Future    Hypermobile flat foot, unspecified laterality  -     X-Ray Foot Complete Right; Future  -     X-Ray Ankle Complete Right; Future    Foot pain, left  -     X-Ray Foot Complete Right; Future  -     X-Ray Ankle Complete Right; Future    Other orders  -     LIDOcaine-prilocaine (EMLA) cream; Apply topically as needed.      I counseled the patient on her conditions, their implications and medical management.        Patient will stretch the tendo achilles complex three times daily as demonstrated in the office.  Literature was dispensed illustrating proper stretching technique.    I applied a plantar rest strapping to the patient's right foot to offload symptomatic area, support the arch, and relieve pain.    Patient will obtain over the counter arch supports and wear them in shoes whenever possible.  Athletic shoes intended for walking or running are usually best.    The patient was advised that NSAID-type medications have two very important potential side effects: gastrointestinal irritation including hemorrhage and renal injuries. She was asked to take the medication with food and to stop if she experiences any GI upset. I asked her to call for vomiting, abdominal pain or black/bloody stools. The patient expresses  understanding of these issues and questions were answered.    Discussed conservative treatment with shoes of adequate dimensions, material, and style to alleviate symptoms and delay or prevent surgical intervention.    [facility], xrays    Declines injection fracture boot pending improvement          Follow up in about 1 month (around 2/28/2025).